# Patient Record
Sex: FEMALE | URBAN - METROPOLITAN AREA
[De-identification: names, ages, dates, MRNs, and addresses within clinical notes are randomized per-mention and may not be internally consistent; named-entity substitution may affect disease eponyms.]

---

## 2023-09-07 ENCOUNTER — APPOINTMENT (RX ONLY)
Dept: URBAN - METROPOLITAN AREA CLINIC 74 | Facility: CLINIC | Age: 33
Setting detail: DERMATOLOGY
End: 2023-09-07

## 2023-09-07 DIAGNOSIS — L259 CONTACT DERMATITIS AND OTHER ECZEMA, UNSPECIFIED CAUSE: ICD-10-CM

## 2023-09-07 PROBLEM — L23.9 ALLERGIC CONTACT DERMATITIS, UNSPECIFIED CAUSE: Status: ACTIVE | Noted: 2023-09-07

## 2023-09-07 PROCEDURE — ? PRESCRIPTION MEDICATION MANAGEMENT

## 2023-09-07 PROCEDURE — 99203 OFFICE O/P NEW LOW 30 MIN: CPT

## 2023-09-07 PROCEDURE — ? COUNSELING

## 2023-09-07 PROCEDURE — ? PATIENT SPECIFIC COUNSELING

## 2023-09-07 PROCEDURE — ? PRESCRIPTION

## 2023-09-07 RX ORDER — PREDNISONE 10 MG/1
TABLET ORAL
Qty: 48 | Refills: 0 | Status: ERX | COMMUNITY
Start: 2023-09-07

## 2023-09-07 RX ADMIN — PREDNISONE: 10 TABLET ORAL at 00:00

## 2023-09-07 ASSESSMENT — LOCATION DETAILED DESCRIPTION DERM: LOCATION DETAILED: INFERIOR THORACIC SPINE

## 2023-09-07 ASSESSMENT — LOCATION SIMPLE DESCRIPTION DERM: LOCATION SIMPLE: UPPER BACK

## 2023-09-07 ASSESSMENT — LOCATION ZONE DERM: LOCATION ZONE: TRUNK

## 2023-09-07 NOTE — HPI: RASH
How Severe Is Your Rash?: moderate
Is This A New Presentation, Or A Follow-Up?: Rash
Additional History: Patient reports rash started in May 2023. Rash is itchy, painful, burning, and has some swelling. Pt has tried TAC 0.1% towards end of July but rash kept spreading. Patient saw a dermatologist August 24 and was rx’d diflucan qd x 1 week and clindamycin lotion. Her dermatologist thought it was an ID reaction. Pt saw a 2nd dermatologist and was diagnosed with allergic reaction. Pt went to the ER Sept 2nd and was diagnosed with allergic rxn and given famotidine 20mg bid and hydroxyzine 50mg. Pt reports she changed her detergent about 2 weeks ago and has rewashed her clothing/ bought new clothing. Pt saw her very first dermatologist and was biopsied. Patient reports results are still pending.

## 2023-09-07 NOTE — PROCEDURE: PATIENT SPECIFIC COUNSELING
Admits to recently going to the ER - given IMK and antihistamines - says worsened day after\\n; and has had a bx done by dr josh rayo but is waiting for the results as recently done\\nShes here today for a second opinion because she’s absolutely miserable due to the severe discomfort shes in.\\nAdmits to having pets, cats in particular, but states they are in door cats and have never been outside.\\nShe thinks she might have an allergy to her detergent possibly.\\nShes currently taking 50 mg of hydroxyzine given to her by her current dermatologist and pepcid that she was given by the ER. She also just picked up a large tub of triamcinolone but hasnt started it yet.\\n\\nDiscussed doing oral steroids, topical steroids and/or antihistamines -sed of all, no obvious CI.\\nAfter discussion she desires to do both oral and topical steroids.\\nWill f/u either with us or w/Dr. Cormier in approx 2 weeks.\\nMay consider patch testing if no improvement, worsening or reacurrance after prednisone.\\n\\nto ER if worsens\\n\\nhypoallergenic, fragrance free skin care strictly emphasized
Detail Level: Detailed

## 2023-09-07 NOTE — PROCEDURE: PRESCRIPTION MEDICATION MANAGEMENT
Initiate Treatment: Take 6 tabs (60 mg) by mouth x 3 days, 4 tabs (40 mg) x 3 days, 3 tabs (30 mg) x 3 days, 2 tabs (20 mg) x 3 days, 1 tab (10 mg) x 3 days then d/c\\nTriamcinolone .1% cream. She already has this rx at home given to her by her usual dermatologist Dr. Cormier
Detail Level: Zone
Render In Strict Bullet Format?: No